# Patient Record
Sex: FEMALE | Race: WHITE | NOT HISPANIC OR LATINO | URBAN - METROPOLITAN AREA
[De-identification: names, ages, dates, MRNs, and addresses within clinical notes are randomized per-mention and may not be internally consistent; named-entity substitution may affect disease eponyms.]

---

## 2018-03-13 ENCOUNTER — INPATIENT HOSPITAL (INPATIENT)
Dept: URBAN - METROPOLITAN AREA HOSPITAL 32 | Facility: HOSPITAL | Age: 38
End: 2018-03-13
Payer: COMMERCIAL

## 2018-03-13 DIAGNOSIS — E78.1 PURE HYPERGLYCERIDEMIA: ICD-10-CM

## 2018-03-13 DIAGNOSIS — R10.13 EPIGASTRIC PAIN: ICD-10-CM

## 2018-03-13 DIAGNOSIS — K85.10 BILIARY ACUTE PANCREATITIS WITHOUT NECROSIS OR INFECTION: ICD-10-CM

## 2018-03-13 PROCEDURE — 99222 1ST HOSP IP/OBS MODERATE 55: CPT

## 2018-03-14 ENCOUNTER — INPATIENT HOSPITAL (INPATIENT)
Dept: URBAN - METROPOLITAN AREA HOSPITAL 32 | Facility: HOSPITAL | Age: 38
End: 2018-03-14
Payer: COMMERCIAL

## 2018-03-14 DIAGNOSIS — R10.13 EPIGASTRIC PAIN: ICD-10-CM

## 2018-03-14 DIAGNOSIS — E78.1 PURE HYPERGLYCERIDEMIA: ICD-10-CM

## 2018-03-14 DIAGNOSIS — K85.00 IDIOPATHIC ACUTE PANCREATITIS WITHOUT NECROSIS OR INFECTION: ICD-10-CM

## 2018-03-14 PROCEDURE — 99232 SBSQ HOSP IP/OBS MODERATE 35: CPT

## 2018-03-17 ENCOUNTER — INPATIENT HOSPITAL (INPATIENT)
Dept: URBAN - METROPOLITAN AREA HOSPITAL 32 | Facility: HOSPITAL | Age: 38
End: 2018-03-17
Payer: COMMERCIAL

## 2018-03-17 DIAGNOSIS — R10.13 EPIGASTRIC PAIN: ICD-10-CM

## 2018-03-17 DIAGNOSIS — K85.00 IDIOPATHIC ACUTE PANCREATITIS WITHOUT NECROSIS OR INFECTION: ICD-10-CM

## 2018-03-17 DIAGNOSIS — E78.1 PURE HYPERGLYCERIDEMIA: ICD-10-CM

## 2018-03-17 PROCEDURE — 99232 SBSQ HOSP IP/OBS MODERATE 35: CPT

## 2018-04-06 ENCOUNTER — OFFICE (INPATIENT)
Dept: URBAN - METROPOLITAN AREA CLINIC 101 | Facility: CLINIC | Age: 38
End: 2018-04-06

## 2018-04-06 VITALS
HEART RATE: 106 BPM | DIASTOLIC BLOOD PRESSURE: 81 MMHG | SYSTOLIC BLOOD PRESSURE: 118 MMHG | WEIGHT: 129 LBS | HEIGHT: 61 IN | TEMPERATURE: 97.9 F

## 2018-04-06 DIAGNOSIS — R10.11 RIGHT UPPER QUADRANT PAIN: ICD-10-CM

## 2018-04-06 PROCEDURE — 99214 OFFICE O/P EST MOD 30 MIN: CPT

## 2018-04-06 NOTE — SERVICEHPINOTES
CHASTITY ARAGON   is a   38  female who complains of continued RUQ pain especially postprandially.   She notes pain occurs after any po intake. She notes nausea without vomiting. She notes light brown soft BMs. She gets pain in RUQ when she tries to defecate (when she pushes down). She denies any melena or rectal bleeding. She notes decreased appetite and is afraid to eat. She denies any heartburn or dysphagia but she takes Prilosec 40 mg po bid and she has been doing this for a couple years. She denies any regular NSAID use. She notes chills over the past couple of weeks.

## 2025-05-19 ENCOUNTER — OFFICE (INPATIENT)
Dept: URBAN - METROPOLITAN AREA CLINIC 102 | Facility: CLINIC | Age: 45
End: 2025-05-19
Payer: COMMERCIAL

## 2025-05-19 VITALS
SYSTOLIC BLOOD PRESSURE: 75 MMHG | DIASTOLIC BLOOD PRESSURE: 59 MMHG | TEMPERATURE: 97.8 F | HEIGHT: 61 IN | WEIGHT: 128 LBS | HEART RATE: 88 BPM

## 2025-05-19 DIAGNOSIS — R13.10 DYSPHAGIA, UNSPECIFIED: ICD-10-CM

## 2025-05-19 DIAGNOSIS — R19.4 CHANGE IN BOWEL HABIT: ICD-10-CM

## 2025-05-19 DIAGNOSIS — R11.0 NAUSEA: ICD-10-CM

## 2025-05-19 DIAGNOSIS — R14.0 ABDOMINAL DISTENSION (GASEOUS): ICD-10-CM

## 2025-05-19 DIAGNOSIS — Z86.39 PERSONAL HISTORY OF OTHER ENDOCRINE, NUTRITIONAL AND METABOL: ICD-10-CM

## 2025-05-19 DIAGNOSIS — R68.81 EARLY SATIETY: ICD-10-CM

## 2025-05-19 DIAGNOSIS — R10.9 UNSPECIFIED ABDOMINAL PAIN: ICD-10-CM

## 2025-05-19 PROCEDURE — 99205 OFFICE O/P NEW HI 60 MIN: CPT | Performed by: NURSE PRACTITIONER

## 2025-05-19 NOTE — SERVICEHPINOTES
CHASTITY ARAGON   is a   45  female who had an EGD in 2019 and was told she has a hernia, esophagitis. Then had a severe dog attack in 2023 and got part of her stomach and thinks it was just tissue. Was started on metformin Aug 2023 and had diarrhea for a year.  Now having constipation. Mentions she is on pain management for "my heart and my body." On morphine (2 times a day) and oxycodone (3 times a day).   Went off of insulin and back on pills. 
br Pt mentions she was having lower abdominal pain and went to ER last month and told she was constipated and told to take MiraLAX daily. Took MiraLAX for 1 week and had diarrhea and then diarrhea stopped. Pt mentions she has a BM and can lose 8 pounds. Can go 6 days with no BM, BSS 1, 2. Denies rectal bleeding, melena. Has tried mag citrate in the past and caused her to sick and wound up in er. Still having lower abdominal pain described as burning and radiates to back and hurts to touch. Can be stabbing pain at time.  Pt also mentions she was given dicyclomine and helps the pain.  Nothing makes it worse. "My stomach and I have a weekly routine, gets really bad and always have pain there and can feel my bowels moving." Fecal urgency x 2 months. 
brNausea at times. Early satiety but attributes to her meds for her heart and diabetes. Pt mentions she has dysphagia with pills and solids intermittent for a few months. Does not eat a lot of solid foods d/t having dentures.  Takes Prilosec 40 mg daily. 
br
br Bloats after eating started around the same time. "Could not tell if it was the medicine or dog bite" br
br Has a MI x 2 in 2023 followed by Dr. Juarez.  Takes 81 mg of aspirin. brDenies kidney disease. Denies hx of stroke brDenies sleep apnea, asthma, COPD br fx history of colon cancer paternal grandmother. brDenies hx of adverse reactions to anesthesia.
brDenies alcohol use, former smoker. 
br
Pt also mentions she is partially blind. 
br
brLast A1C 8.2 1 month ago. 
br
CT scan in ER 3/14/2025: 1. large amount of stool throughout the colon. No evidence of appendicitis, fluid or free air.

## 2025-05-30 ENCOUNTER — OFFICE (INPATIENT)
Dept: URBAN - METROPOLITAN AREA CLINIC 102 | Facility: CLINIC | Age: 45
End: 2025-05-30
Payer: COMMERCIAL

## 2025-05-30 VITALS
HEIGHT: 61 IN | TEMPERATURE: 97.7 F | WEIGHT: 128 LBS | SYSTOLIC BLOOD PRESSURE: 87 MMHG | HEART RATE: 97 BPM | DIASTOLIC BLOOD PRESSURE: 69 MMHG

## 2025-05-30 DIAGNOSIS — R13.10 DYSPHAGIA, UNSPECIFIED: ICD-10-CM

## 2025-05-30 DIAGNOSIS — R93.3 ABNORMAL FINDINGS ON DIAGNOSTIC IMAGING OF OTHER PARTS OF DI: ICD-10-CM

## 2025-05-30 DIAGNOSIS — R10.9 UNSPECIFIED ABDOMINAL PAIN: ICD-10-CM

## 2025-05-30 DIAGNOSIS — R19.4 CHANGE IN BOWEL HABIT: ICD-10-CM

## 2025-05-30 DIAGNOSIS — F11.90 OPIOID USE, UNSPECIFIED, UNCOMPLICATED: ICD-10-CM

## 2025-05-30 DIAGNOSIS — R68.81 EARLY SATIETY: ICD-10-CM

## 2025-05-30 PROCEDURE — 99214 OFFICE O/P EST MOD 30 MIN: CPT | Performed by: NURSE PRACTITIONER

## 2025-06-13 ENCOUNTER — ON CAMPUS - OUTPATIENT (INPATIENT)
Dept: URBAN - METROPOLITAN AREA HOSPITAL 16 | Facility: HOSPITAL | Age: 45
End: 2025-06-13
Payer: MEDICAID

## 2025-06-13 DIAGNOSIS — R19.7 DIARRHEA, UNSPECIFIED: ICD-10-CM

## 2025-06-13 DIAGNOSIS — K64.9 UNSPECIFIED HEMORRHOIDS: ICD-10-CM

## 2025-06-13 DIAGNOSIS — R93.3 ABNORMAL FINDINGS ON DIAGNOSTIC IMAGING OF OTHER PARTS OF DI: ICD-10-CM

## 2025-06-13 DIAGNOSIS — K63.89 OTHER SPECIFIED DISEASES OF INTESTINE: ICD-10-CM

## 2025-06-13 PROCEDURE — 45380 COLONOSCOPY AND BIOPSY: CPT | Performed by: INTERNAL MEDICINE
